# Patient Record
Sex: MALE | Race: WHITE | NOT HISPANIC OR LATINO | Employment: FULL TIME | URBAN - NONMETROPOLITAN AREA
[De-identification: names, ages, dates, MRNs, and addresses within clinical notes are randomized per-mention and may not be internally consistent; named-entity substitution may affect disease eponyms.]

---

## 2020-09-08 ENCOUNTER — TRANSCRIBE ORDERS (OUTPATIENT)
Dept: ADMINISTRATIVE | Facility: HOSPITAL | Age: 25
End: 2020-09-08

## 2020-09-08 DIAGNOSIS — N50.89 TESTICULAR NODULE: Primary | ICD-10-CM

## 2020-09-25 ENCOUNTER — HOSPITAL ENCOUNTER (OUTPATIENT)
Dept: ULTRASOUND IMAGING | Facility: HOSPITAL | Age: 25
Discharge: HOME | End: 2020-09-25
Admitting: NURSE PRACTITIONER

## 2020-09-25 DIAGNOSIS — N50.89 TESTICULAR NODULE: ICD-10-CM

## 2020-09-25 PROCEDURE — 76870 US EXAM SCROTUM: CPT

## 2020-09-25 PROCEDURE — 76870 US EXAM SCROTUM: CPT | Performed by: RADIOLOGY

## 2020-10-09 ENCOUNTER — TRANSCRIBE ORDERS (OUTPATIENT)
Dept: ADMINISTRATIVE | Facility: HOSPITAL | Age: 25
End: 2020-10-09

## 2020-10-09 DIAGNOSIS — IMO0001 ASYMMETRICAL SENSORINEURAL HEARING LOSS OF BOTH EARS: ICD-10-CM

## 2020-10-09 DIAGNOSIS — R42 DIZZINESS AND GIDDINESS: Primary | ICD-10-CM

## 2020-10-20 ENCOUNTER — HOSPITAL ENCOUNTER (OUTPATIENT)
Dept: MRI IMAGING | Facility: HOSPITAL | Age: 25
Discharge: HOME | End: 2020-10-20

## 2020-10-20 DIAGNOSIS — IMO0001 ASYMMETRICAL SENSORINEURAL HEARING LOSS OF BOTH EARS: ICD-10-CM

## 2020-10-20 DIAGNOSIS — R42 DIZZINESS AND GIDDINESS: ICD-10-CM

## 2020-10-20 PROCEDURE — 0 GADOBENATE DIMEGLUMINE 529 MG/ML SOLUTION: Performed by: NURSE PRACTITIONER

## 2020-10-20 PROCEDURE — 70553 MRI BRAIN STEM W/O & W/DYE: CPT | Performed by: RADIOLOGY

## 2020-10-20 PROCEDURE — 70553 MRI BRAIN STEM W/O & W/DYE: CPT

## 2020-10-20 PROCEDURE — 70543 MRI ORBT/FAC/NCK W/O &W/DYE: CPT | Performed by: RADIOLOGY

## 2020-10-20 PROCEDURE — A9577 INJ MULTIHANCE: HCPCS | Performed by: NURSE PRACTITIONER

## 2020-10-20 RX ADMIN — GADOBENATE DIMEGLUMINE 14 ML: 529 INJECTION, SOLUTION INTRAVENOUS at 10:11

## 2021-04-16 ENCOUNTER — HOSPITAL ENCOUNTER (OUTPATIENT)
Dept: GENERAL RADIOLOGY | Facility: HOSPITAL | Age: 26
Discharge: HOME | End: 2021-04-16
Admitting: PSYCHIATRY & NEUROLOGY

## 2021-04-16 ENCOUNTER — TRANSCRIBE ORDERS (OUTPATIENT)
Dept: ADMINISTRATIVE | Facility: HOSPITAL | Age: 26
End: 2021-04-16

## 2021-04-16 DIAGNOSIS — R42 DIZZINESS AND GIDDINESS: Primary | ICD-10-CM

## 2021-04-16 DIAGNOSIS — R42 DIZZINESS AND GIDDINESS: ICD-10-CM

## 2021-04-16 PROCEDURE — 72050 X-RAY EXAM NECK SPINE 4/5VWS: CPT

## 2021-04-16 PROCEDURE — 72050 X-RAY EXAM NECK SPINE 4/5VWS: CPT | Performed by: RADIOLOGY

## 2021-08-20 ENCOUNTER — TRANSCRIBE ORDERS (OUTPATIENT)
Dept: ADMINISTRATIVE | Facility: HOSPITAL | Age: 26
End: 2021-08-20

## 2021-08-20 ENCOUNTER — LAB (OUTPATIENT)
Dept: LAB | Facility: HOSPITAL | Age: 26
End: 2021-08-20

## 2021-08-20 DIAGNOSIS — Z11.59 SPECIAL SCREENING EXAMINATION FOR UNSPECIFIED VIRAL DISEASE: ICD-10-CM

## 2021-08-20 DIAGNOSIS — Z11.59 SPECIAL SCREENING EXAMINATION FOR UNSPECIFIED VIRAL DISEASE: Primary | ICD-10-CM

## 2021-08-20 LAB — SARS-COV-2 RNA PNL SPEC NAA+PROBE: NOT DETECTED

## 2021-08-20 PROCEDURE — U0004 COV-19 TEST NON-CDC HGH THRU: HCPCS | Performed by: INTERNAL MEDICINE

## 2021-08-20 PROCEDURE — C9803 HOPD COVID-19 SPEC COLLECT: HCPCS

## 2023-02-22 RX ORDER — PREDNISONE 1 MG/1
5 TABLET ORAL 3 TIMES DAILY
Qty: 15 TABLET | Refills: 0 | Status: SHIPPED | OUTPATIENT
Start: 2023-02-22 | End: 2023-02-27

## 2023-03-29 DIAGNOSIS — Z00.00 ANNUAL PHYSICAL EXAM: ICD-10-CM

## 2023-03-29 DIAGNOSIS — R53.83 FATIGUE, UNSPECIFIED TYPE: Primary | ICD-10-CM

## 2023-03-29 DIAGNOSIS — E55.9 VITAMIN D DEFICIENCY: ICD-10-CM

## 2023-03-31 DIAGNOSIS — Z00.00 ANNUAL PHYSICAL EXAM: ICD-10-CM

## 2023-03-31 DIAGNOSIS — R53.83 FATIGUE, UNSPECIFIED TYPE: ICD-10-CM

## 2023-03-31 DIAGNOSIS — E55.9 VITAMIN D DEFICIENCY: ICD-10-CM

## 2023-03-31 LAB
ALBUMIN SERPL-MCNC: 4.9 G/DL (ref 3.5–5.2)
ALBUMIN/GLOB SERPL: 1.7 G/DL
ALP SERPL-CCNC: 75 U/L (ref 39–117)
ALT SERPL W P-5'-P-CCNC: 18 U/L (ref 1–41)
ANION GAP SERPL CALCULATED.3IONS-SCNC: 7.9 MMOL/L (ref 5–15)
AST SERPL-CCNC: 22 U/L (ref 1–40)
BASOPHILS # BLD AUTO: 0.02 10*3/MM3 (ref 0–0.2)
BASOPHILS NFR BLD AUTO: 0.4 % (ref 0–1.5)
BILIRUB SERPL-MCNC: 0.6 MG/DL (ref 0–1.2)
BUN SERPL-MCNC: 18 MG/DL (ref 6–20)
BUN/CREAT SERPL: 17.3 (ref 7–25)
CALCIUM SPEC-SCNC: 9.9 MG/DL (ref 8.6–10.5)
CHLORIDE SERPL-SCNC: 110 MMOL/L (ref 98–107)
CO2 SERPL-SCNC: 27.1 MMOL/L (ref 22–29)
CREAT SERPL-MCNC: 1.04 MG/DL (ref 0.76–1.27)
DEPRECATED RDW RBC AUTO: 36.9 FL (ref 37–54)
EGFRCR SERPLBLD CKD-EPI 2021: 101.6 ML/MIN/1.73
EOSINOPHIL # BLD AUTO: 0.06 10*3/MM3 (ref 0–0.4)
EOSINOPHIL NFR BLD AUTO: 1.2 % (ref 0.3–6.2)
ERYTHROCYTE [DISTWIDTH] IN BLOOD BY AUTOMATED COUNT: 11.7 % (ref 12.3–15.4)
FERRITIN SERPL-MCNC: 150.1 NG/ML (ref 30–400)
GLOBULIN UR ELPH-MCNC: 2.9 GM/DL
GLUCOSE SERPL-MCNC: 89 MG/DL (ref 65–99)
HCT VFR BLD AUTO: 46.8 % (ref 37.5–51)
HGB BLD-MCNC: 16.5 G/DL (ref 13–17.7)
IMM GRANULOCYTES # BLD AUTO: 0.01 10*3/MM3 (ref 0–0.05)
IMM GRANULOCYTES NFR BLD AUTO: 0.2 % (ref 0–0.5)
IRON 24H UR-MRATE: 134 MCG/DL (ref 59–158)
IRON SATN MFR SERPL: 33 % (ref 20–50)
LYMPHOCYTES # BLD AUTO: 1.9 10*3/MM3 (ref 0.7–3.1)
LYMPHOCYTES NFR BLD AUTO: 37.3 % (ref 19.6–45.3)
MCH RBC QN AUTO: 30.2 PG (ref 26.6–33)
MCHC RBC AUTO-ENTMCNC: 35.3 G/DL (ref 31.5–35.7)
MCV RBC AUTO: 85.6 FL (ref 79–97)
MONOCYTES # BLD AUTO: 0.32 10*3/MM3 (ref 0.1–0.9)
MONOCYTES NFR BLD AUTO: 6.3 % (ref 5–12)
NEUTROPHILS NFR BLD AUTO: 2.78 10*3/MM3 (ref 1.7–7)
NEUTROPHILS NFR BLD AUTO: 54.6 % (ref 42.7–76)
NRBC BLD AUTO-RTO: 0 /100 WBC (ref 0–0.2)
PLATELET # BLD AUTO: 203 10*3/MM3 (ref 140–450)
PMV BLD AUTO: 9.7 FL (ref 6–12)
POTASSIUM SERPL-SCNC: 4.7 MMOL/L (ref 3.5–5.2)
PROT SERPL-MCNC: 7.8 G/DL (ref 6–8.5)
RBC # BLD AUTO: 5.47 10*6/MM3 (ref 4.14–5.8)
SODIUM SERPL-SCNC: 145 MMOL/L (ref 136–145)
TIBC SERPL-MCNC: 404 MCG/DL (ref 298–536)
TRANSFERRIN SERPL-MCNC: 271 MG/DL (ref 200–360)
TSH SERPL DL<=0.05 MIU/L-ACNC: 2.32 UIU/ML (ref 0.27–4.2)
WBC NRBC COR # BLD: 5.09 10*3/MM3 (ref 3.4–10.8)

## 2023-03-31 PROCEDURE — 80053 COMPREHEN METABOLIC PANEL: CPT | Performed by: NURSE PRACTITIONER

## 2023-03-31 PROCEDURE — 85025 COMPLETE CBC W/AUTO DIFF WBC: CPT | Performed by: NURSE PRACTITIONER

## 2023-03-31 PROCEDURE — 82728 ASSAY OF FERRITIN: CPT | Performed by: NURSE PRACTITIONER

## 2023-03-31 PROCEDURE — 82306 VITAMIN D 25 HYDROXY: CPT | Performed by: NURSE PRACTITIONER

## 2023-03-31 PROCEDURE — 83540 ASSAY OF IRON: CPT | Performed by: NURSE PRACTITIONER

## 2023-03-31 PROCEDURE — 82746 ASSAY OF FOLIC ACID SERUM: CPT | Performed by: NURSE PRACTITIONER

## 2023-03-31 PROCEDURE — 84466 ASSAY OF TRANSFERRIN: CPT | Performed by: NURSE PRACTITIONER

## 2023-03-31 PROCEDURE — 82607 VITAMIN B-12: CPT | Performed by: NURSE PRACTITIONER

## 2023-03-31 PROCEDURE — 84443 ASSAY THYROID STIM HORMONE: CPT | Performed by: NURSE PRACTITIONER

## 2023-04-01 LAB
25(OH)D3 SERPL-MCNC: 31.9 NG/ML (ref 30–100)
FOLATE SERPL-MCNC: 14.3 NG/ML (ref 4.78–24.2)
VIT B12 BLD-MCNC: 677 PG/ML (ref 211–946)

## 2023-04-03 LAB — REF LAB TEST METHOD: NORMAL

## 2024-03-19 RX ORDER — METHYLPREDNISOLONE 8 MG/1
8 TABLET ORAL
Status: DISCONTINUED | OUTPATIENT
Start: 2024-03-19 | End: 2024-03-19

## 2024-03-19 RX ORDER — METHYLPREDNISOLONE 4 MG/1
4 TABLET ORAL
Status: DISCONTINUED | OUTPATIENT
Start: 2024-03-23 | End: 2024-03-19

## 2024-03-19 RX ORDER — METHYLPREDNISOLONE 4 MG/1
4 TABLET ORAL
Status: DISCONTINUED | OUTPATIENT
Start: 2024-03-21 | End: 2024-03-19

## 2024-03-19 RX ORDER — METHYLPREDNISOLONE 4 MG/1
4 TABLET ORAL
Status: DISCONTINUED | OUTPATIENT
Start: 2024-03-24 | End: 2024-03-19

## 2024-03-19 RX ORDER — METHYLPREDNISOLONE 4 MG/1
4 TABLET ORAL
Status: DISCONTINUED | OUTPATIENT
Start: 2024-03-19 | End: 2024-03-19

## 2024-03-19 RX ORDER — METHYLPREDNISOLONE 4 MG/1
TABLET ORAL
Qty: 21 TABLET | Refills: 0 | Status: SHIPPED | OUTPATIENT
Start: 2024-03-19

## 2024-03-19 RX ORDER — METHYLPREDNISOLONE 4 MG/1
4 TABLET ORAL
Status: DISCONTINUED | OUTPATIENT
Start: 2024-03-22 | End: 2024-03-19

## 2024-03-19 RX ORDER — METHYLPREDNISOLONE 4 MG/1
4 TABLET ORAL
Status: DISCONTINUED | OUTPATIENT
Start: 2024-03-20 | End: 2024-03-19

## 2024-03-19 RX ORDER — METHYLPREDNISOLONE 8 MG/1
8 TABLET ORAL
Status: DISCONTINUED | OUTPATIENT
Start: 2024-03-20 | End: 2024-03-19

## 2024-06-14 RX ORDER — METHYLPREDNISOLONE 4 MG/1
TABLET ORAL
Qty: 21 TABLET | Refills: 0 | Status: SHIPPED | OUTPATIENT
Start: 2024-06-14

## 2024-09-27 ENCOUNTER — OFFICE VISIT (OUTPATIENT)
Dept: FAMILY MEDICINE CLINIC | Facility: CLINIC | Age: 29
End: 2024-09-27
Payer: COMMERCIAL

## 2024-09-27 ENCOUNTER — LAB (OUTPATIENT)
Dept: FAMILY MEDICINE CLINIC | Facility: CLINIC | Age: 29
End: 2024-09-27
Payer: COMMERCIAL

## 2024-09-27 ENCOUNTER — HOSPITAL ENCOUNTER (OUTPATIENT)
Dept: RESPIRATORY THERAPY | Facility: HOSPITAL | Age: 29
Discharge: HOME | End: 2024-09-27
Admitting: NURSE PRACTITIONER
Payer: COMMERCIAL

## 2024-09-27 VITALS
TEMPERATURE: 97 F | HEART RATE: 50 BPM | SYSTOLIC BLOOD PRESSURE: 124 MMHG | OXYGEN SATURATION: 99 % | WEIGHT: 157.2 LBS | DIASTOLIC BLOOD PRESSURE: 72 MMHG | HEIGHT: 70 IN | BODY MASS INDEX: 22.5 KG/M2

## 2024-09-27 DIAGNOSIS — R01.1 HEART MURMUR: ICD-10-CM

## 2024-09-27 DIAGNOSIS — R00.1 BRADYCARDIA: ICD-10-CM

## 2024-09-27 DIAGNOSIS — R00.2 PALPITATIONS: Primary | ICD-10-CM

## 2024-09-27 DIAGNOSIS — R53.83 OTHER FATIGUE: ICD-10-CM

## 2024-09-27 DIAGNOSIS — R73.9 HYPERGLYCEMIA: ICD-10-CM

## 2024-09-27 DIAGNOSIS — R00.2 PALPITATIONS: ICD-10-CM

## 2024-09-27 DIAGNOSIS — Z13.6 SCREENING FOR CARDIOVASCULAR CONDITION: ICD-10-CM

## 2024-09-27 PROCEDURE — 80050 GENERAL HEALTH PANEL: CPT | Performed by: NURSE PRACTITIONER

## 2024-09-27 PROCEDURE — 93000 ELECTROCARDIOGRAM COMPLETE: CPT | Performed by: NURSE PRACTITIONER

## 2024-09-27 PROCEDURE — 81003 URINALYSIS AUTO W/O SCOPE: CPT | Performed by: NURSE PRACTITIONER

## 2024-09-27 PROCEDURE — 80061 LIPID PANEL: CPT | Performed by: NURSE PRACTITIONER

## 2024-09-27 PROCEDURE — 83036 HEMOGLOBIN GLYCOSYLATED A1C: CPT | Performed by: NURSE PRACTITIONER

## 2024-09-27 PROCEDURE — 93242 EXT ECG>48HR<7D RECORDING: CPT

## 2024-09-27 PROCEDURE — 99204 OFFICE O/P NEW MOD 45 MIN: CPT | Performed by: NURSE PRACTITIONER

## 2024-09-28 LAB
ALBUMIN SERPL-MCNC: 5.2 G/DL (ref 3.5–5.2)
ALBUMIN/GLOB SERPL: 2.1 G/DL
ALP SERPL-CCNC: 75 U/L (ref 39–117)
ALT SERPL W P-5'-P-CCNC: 22 U/L (ref 1–41)
ANION GAP SERPL CALCULATED.3IONS-SCNC: 12 MMOL/L (ref 5–15)
AST SERPL-CCNC: 20 U/L (ref 1–40)
BILIRUB SERPL-MCNC: 0.7 MG/DL (ref 0–1.2)
BILIRUB UR QL STRIP: NEGATIVE
BUN SERPL-MCNC: 12 MG/DL (ref 6–20)
BUN/CREAT SERPL: 12 (ref 7–25)
CALCIUM SPEC-SCNC: 10.5 MG/DL (ref 8.6–10.5)
CHLORIDE SERPL-SCNC: 102 MMOL/L (ref 98–107)
CHOLEST SERPL-MCNC: 198 MG/DL (ref 0–200)
CLARITY UR: CLEAR
CO2 SERPL-SCNC: 28 MMOL/L (ref 22–29)
COLOR UR: YELLOW
CREAT SERPL-MCNC: 1 MG/DL (ref 0.76–1.27)
DEPRECATED RDW RBC AUTO: 39.9 FL (ref 37–54)
EGFRCR SERPLBLD CKD-EPI 2021: 105.8 ML/MIN/1.73
ERYTHROCYTE [DISTWIDTH] IN BLOOD BY AUTOMATED COUNT: 12.3 % (ref 12.3–15.4)
GLOBULIN UR ELPH-MCNC: 2.5 GM/DL
GLUCOSE SERPL-MCNC: 82 MG/DL (ref 65–99)
GLUCOSE UR STRIP-MCNC: NEGATIVE MG/DL
HBA1C MFR BLD: 4.9 % (ref 4.8–5.6)
HCT VFR BLD AUTO: 49.7 % (ref 37.5–51)
HDLC SERPL-MCNC: 49 MG/DL (ref 40–60)
HGB BLD-MCNC: 17.3 G/DL (ref 13–17.7)
HGB UR QL STRIP.AUTO: NEGATIVE
HOLD SPECIMEN: NORMAL
KETONES UR QL STRIP: NEGATIVE
LDLC SERPL CALC-MCNC: 128 MG/DL (ref 0–100)
LDLC/HDLC SERPL: 2.57 {RATIO}
LEUKOCYTE ESTERASE UR QL STRIP.AUTO: NEGATIVE
MCH RBC QN AUTO: 30.8 PG (ref 26.6–33)
MCHC RBC AUTO-ENTMCNC: 34.8 G/DL (ref 31.5–35.7)
MCV RBC AUTO: 88.4 FL (ref 79–97)
NITRITE UR QL STRIP: NEGATIVE
PH UR STRIP.AUTO: 6.5 [PH] (ref 5–8)
PLATELET # BLD AUTO: 213 10*3/MM3 (ref 140–450)
PMV BLD AUTO: 10.5 FL (ref 6–12)
POTASSIUM SERPL-SCNC: 4.2 MMOL/L (ref 3.5–5.2)
PROT SERPL-MCNC: 7.7 G/DL (ref 6–8.5)
PROT UR QL STRIP: NEGATIVE
RBC # BLD AUTO: 5.62 10*6/MM3 (ref 4.14–5.8)
SODIUM SERPL-SCNC: 142 MMOL/L (ref 136–145)
SP GR UR STRIP: 1.01 (ref 1–1.03)
TRIGL SERPL-MCNC: 116 MG/DL (ref 0–150)
TSH SERPL DL<=0.05 MIU/L-ACNC: 3.17 UIU/ML (ref 0.27–4.2)
UROBILINOGEN UR QL STRIP: NORMAL
VLDLC SERPL-MCNC: 21 MG/DL (ref 5–40)
WBC NRBC COR # BLD AUTO: 5.33 10*3/MM3 (ref 3.4–10.8)

## 2024-09-30 ENCOUNTER — TELEPHONE (OUTPATIENT)
Dept: FAMILY MEDICINE CLINIC | Facility: CLINIC | Age: 29
End: 2024-09-30
Payer: COMMERCIAL

## 2024-10-02 ENCOUNTER — PATIENT ROUNDING (BHMG ONLY) (OUTPATIENT)
Dept: FAMILY MEDICINE CLINIC | Facility: CLINIC | Age: 29
End: 2024-10-02
Payer: COMMERCIAL

## 2024-10-04 ENCOUNTER — OFFICE VISIT (OUTPATIENT)
Dept: CARDIOLOGY | Facility: CLINIC | Age: 29
End: 2024-10-04
Payer: COMMERCIAL

## 2024-10-04 VITALS
BODY MASS INDEX: 23.31 KG/M2 | HEART RATE: 54 BPM | WEIGHT: 157.4 LBS | DIASTOLIC BLOOD PRESSURE: 70 MMHG | OXYGEN SATURATION: 99 % | SYSTOLIC BLOOD PRESSURE: 113 MMHG | RESPIRATION RATE: 16 BRPM | HEIGHT: 69 IN

## 2024-10-04 DIAGNOSIS — R06.09 DOE (DYSPNEA ON EXERTION): ICD-10-CM

## 2024-10-04 DIAGNOSIS — R06.02 SHORTNESS OF BREATH: ICD-10-CM

## 2024-10-04 DIAGNOSIS — R00.2 PALPITATIONS: Primary | ICD-10-CM

## 2024-10-11 DIAGNOSIS — R53.83 OTHER FATIGUE: Primary | ICD-10-CM

## 2024-10-11 DIAGNOSIS — R00.1 BRADYCARDIA: ICD-10-CM

## 2024-10-18 ENCOUNTER — HOSPITAL ENCOUNTER (OUTPATIENT)
Dept: CARDIOLOGY | Facility: HOSPITAL | Age: 29
Discharge: HOME | End: 2024-10-18
Payer: COMMERCIAL

## 2024-10-18 DIAGNOSIS — R00.2 PALPITATIONS: ICD-10-CM

## 2024-10-18 DIAGNOSIS — R06.02 SHORTNESS OF BREATH: ICD-10-CM

## 2024-10-18 PROCEDURE — 93017 CV STRESS TEST TRACING ONLY: CPT

## 2024-10-18 PROCEDURE — 93306 TTE W/DOPPLER COMPLETE: CPT

## 2024-10-21 LAB
BH CV ECHO MEAS - ACS: 2.2 CM
BH CV ECHO MEAS - AO MAX PG: 6.5 MMHG
BH CV ECHO MEAS - AO MEAN PG: 3 MMHG
BH CV ECHO MEAS - AO ROOT DIAM: 2.6 CM
BH CV ECHO MEAS - AO V2 MAX: 127 CM/SEC
BH CV ECHO MEAS - AO V2 VTI: 30 CM
BH CV ECHO MEAS - EDV(CUBED): 97.3 ML
BH CV ECHO MEAS - EDV(MOD-SP2): 107 ML
BH CV ECHO MEAS - EDV(MOD-SP4): 104 ML
BH CV ECHO MEAS - EF(MOD-BP): 62.8 %
BH CV ECHO MEAS - EF(MOD-SP2): 61.6 %
BH CV ECHO MEAS - EF(MOD-SP4): 62.9 %
BH CV ECHO MEAS - ESV(CUBED): 32.8 ML
BH CV ECHO MEAS - ESV(MOD-SP2): 41.1 ML
BH CV ECHO MEAS - ESV(MOD-SP4): 38.6 ML
BH CV ECHO MEAS - FS: 30.4 %
BH CV ECHO MEAS - IVS/LVPW: 1 CM
BH CV ECHO MEAS - IVSD: 1 CM
BH CV ECHO MEAS - LA DIMENSION: 2.7 CM
BH CV ECHO MEAS - LAT PEAK E' VEL: 21 CM/SEC
BH CV ECHO MEAS - LV DIASTOLIC VOL/BSA (35-75): 55.8 CM2
BH CV ECHO MEAS - LV MASS(C)D: 158.8 GRAMS
BH CV ECHO MEAS - LV MAX PG: 4.7 MMHG
BH CV ECHO MEAS - LV MEAN PG: 3 MMHG
BH CV ECHO MEAS - LV SYSTOLIC VOL/BSA (12-30): 20.7 CM2
BH CV ECHO MEAS - LV V1 MAX: 108 CM/SEC
BH CV ECHO MEAS - LV V1 VTI: 25.5 CM
BH CV ECHO MEAS - LVIDD: 4.6 CM
BH CV ECHO MEAS - LVIDS: 3.2 CM
BH CV ECHO MEAS - LVPWD: 1 CM
BH CV ECHO MEAS - MED PEAK E' VEL: 14.8 CM/SEC
BH CV ECHO MEAS - MV A MAX VEL: 31.7 CM/SEC
BH CV ECHO MEAS - MV DEC SLOPE: 354 CM/SEC2
BH CV ECHO MEAS - MV DEC TIME: 0.32 SEC
BH CV ECHO MEAS - MV E MAX VEL: 112 CM/SEC
BH CV ECHO MEAS - MV E/A: 3.5
BH CV ECHO MEAS - PA ACC TIME: 0.22 SEC
BH CV ECHO MEAS - RAP SYSTOLE: 10 MMHG
BH CV ECHO MEAS - RVSP: 29.9 MMHG
BH CV ECHO MEAS - SV(MOD-SP2): 65.9 ML
BH CV ECHO MEAS - SV(MOD-SP4): 65.4 ML
BH CV ECHO MEAS - SVI(MOD-SP2): 35.4 ML/M2
BH CV ECHO MEAS - SVI(MOD-SP4): 35.1 ML/M2
BH CV ECHO MEAS - TAPSE (>1.6): 2.43 CM
BH CV ECHO MEAS - TR MAX PG: 19.9 MMHG
BH CV ECHO MEAS - TR MAX VEL: 223 CM/SEC
BH CV ECHO MEASUREMENTS AVERAGE E/E' RATIO: 6.26
BH CV STRESS BP STAGE 1: NORMAL
BH CV STRESS BP STAGE 2: NORMAL
BH CV STRESS BP STAGE 3: NORMAL
BH CV STRESS BP STAGE 4: NORMAL
BH CV STRESS BP STAGE 5: NORMAL
BH CV STRESS DURATION MIN STAGE 1: 3
BH CV STRESS DURATION MIN STAGE 2: 3
BH CV STRESS DURATION MIN STAGE 3: 3
BH CV STRESS DURATION MIN STAGE 4: 3
BH CV STRESS DURATION MIN STAGE 5: 3
BH CV STRESS DURATION MIN STAGE 6: 0
BH CV STRESS DURATION SEC STAGE 1: 0
BH CV STRESS DURATION SEC STAGE 2: 0
BH CV STRESS DURATION SEC STAGE 3: 0
BH CV STRESS DURATION SEC STAGE 4: 0
BH CV STRESS DURATION SEC STAGE 5: 0
BH CV STRESS DURATION SEC STAGE 6: 1
BH CV STRESS GRADE STAGE 1: 10
BH CV STRESS GRADE STAGE 2: 12
BH CV STRESS GRADE STAGE 3: 14
BH CV STRESS GRADE STAGE 4: 16
BH CV STRESS GRADE STAGE 5: 18
BH CV STRESS GRADE STAGE 6: 20
BH CV STRESS HR STAGE 1: 82
BH CV STRESS HR STAGE 2: 99
BH CV STRESS HR STAGE 3: 133
BH CV STRESS HR STAGE 4: 133
BH CV STRESS HR STAGE 5: 164
BH CV STRESS HR STAGE 6: 164
BH CV STRESS METS STAGE 1: 5
BH CV STRESS METS STAGE 2: 7.5
BH CV STRESS METS STAGE 3: 10
BH CV STRESS METS STAGE 4: 13.5
BH CV STRESS METS STAGE 5: 15
BH CV STRESS METS STAGE 6: 17
BH CV STRESS PROTOCOL 1: NORMAL
BH CV STRESS RECOVERY BP: NORMAL MMHG
BH CV STRESS RECOVERY HR: 90 BPM
BH CV STRESS SPEED STAGE 1: 1.7
BH CV STRESS SPEED STAGE 2: 2.5
BH CV STRESS SPEED STAGE 3: 3.4
BH CV STRESS SPEED STAGE 4: 4.2
BH CV STRESS SPEED STAGE 5: 5
BH CV STRESS SPEED STAGE 6: 5.5 MPH
BH CV STRESS STAGE 1: 1
BH CV STRESS STAGE 2: 2
BH CV STRESS STAGE 3: 3
BH CV STRESS STAGE 4: 4
BH CV STRESS STAGE 5: 5
BH CV STRESS STAGE 6: 6
LEFT ATRIUM VOLUME INDEX: 16.5 ML/M2
MAXIMAL PREDICTED HEART RATE: 193 BPM
PERCENT MAX PREDICTED HR: 84.97 %
STRESS BASELINE BP: NORMAL MMHG
STRESS BASELINE HR: 72 BPM
STRESS PERCENT HR: 100 %
STRESS POST ESTIMATED WORKLOAD: 17.2 METS
STRESS POST EXERCISE DUR MIN: 15 MIN
STRESS POST EXERCISE DUR SEC: 1 SEC
STRESS POST PEAK BP: NORMAL MMHG
STRESS POST PEAK HR: 164 BPM
STRESS TARGET HR: 164 BPM

## 2024-11-01 ENCOUNTER — OFFICE VISIT (OUTPATIENT)
Dept: CARDIOLOGY | Facility: CLINIC | Age: 29
End: 2024-11-01
Payer: COMMERCIAL

## 2024-11-01 VITALS
DIASTOLIC BLOOD PRESSURE: 63 MMHG | WEIGHT: 157.4 LBS | OXYGEN SATURATION: 100 % | HEIGHT: 69 IN | SYSTOLIC BLOOD PRESSURE: 121 MMHG | HEART RATE: 59 BPM | BODY MASS INDEX: 23.31 KG/M2

## 2024-11-01 DIAGNOSIS — Z82.49 FAMILY HISTORY OF CORONARY ARTERY DISEASE: ICD-10-CM

## 2024-11-01 DIAGNOSIS — R00.2 PALPITATIONS: Primary | ICD-10-CM

## 2024-11-01 DIAGNOSIS — R06.02 SHORTNESS OF BREATH: ICD-10-CM

## 2024-11-01 NOTE — PROGRESS NOTES
Kelly De La Torre, LYNNE  No ref. provider found    Kevin Lorenzana  1995  10/04/2024    Subjective     Kevin Lorenzana is a 28 y.o. male who presents today to [facility]ARDIOLOG[facility] for Follow-up (Test results /) and Med Management (Verbal. Patient takes no medications, /).    Follow-up    28-year-old male with history of heart murmur previously had an echocardiogram back in 2015 which was unremarkable comes in today for evaluation.  Patient reports that he has been having episodes of  palpitations about every day for last 1 month.  Patient also reports that he had an episode of increased fatigue while he was on a hike about 2 weeks ago. Patient reports that he wore an event monitor for about 3 days.  Which he returned last Monday the results are not still back for review yet.  He does report that he is physically very active and about a month ago did a 7 mile run he denies noticing any decline in his tolerance of physical activity. .  No significant chest pain shortness of breath with physical activity.  Denies having any episodes of loss of consciousness.     Denies having any allergies.  Family history positive for heart disease in mother above age of 55.  Denies smoking or drug use.     Today's visit 11/1/2024:  Patient comes in today for follow-up visit.  His echocardiogram was done on 10/21/2024 which shows normal ejection fraction with normal valvular function with no significant abnormalities.  He got the exercise stress EKG done on 10/21/2024 in which she exercised for 15 minutes.  No evidence of ischemia or chest pain.  Reviewed the results of his heart monitor which shows heart rate ranging from 35 to 143 bpm.  Up to 2.2-second pause noted while patient was asleep.     No Known Allergies:    Current Outpatient Medications:     cyclobenzaprine (FLEXERIL) 5 MG tablet, TAKE 1 TABLET BY MOUTH AT BEDTIME. (Patient not taking: Reported on 11/1/2024), Disp: 30 tablet, Rfl: 0     "methylPREDNISolone (MEDROL) 4 MG dose pack, TAKE BY MOUTH AS DIRECTED ON PACKAGE. (Patient not taking: Reported on 11/1/2024), Disp: 21 tablet, Rfl: 0    rizatriptan (Maxalt) 10 MG tablet, Take 1 tablet by mouth once at onset of headache, may repeat at 2 hour intervals; do not exceed 30 mg in 24 hours. (Patient not taking: Reported on 11/1/2024), Disp: 10 tablet, Rfl: 0    triamcinolone (KENALOG) 0.1 % cream, Apply a thin layer to the affected area twice daily. (Patient not taking: Reported on 11/1/2024), Disp: 60 g, Rfl: 2    ubrogepant (ubrogepant) 50 MG tablet, Take 1 tablet by mouth as needed for migraine may repeat dose once after 2 hours if needed (Patient not taking: Reported on 11/1/2024), Disp: 16 tablet, Rfl: 0    Past Medical History:   Diagnosis Date    Heart murmur      No past surgical history on file.  Family History   Problem Relation Age of Onset    Heart disease Mother     No Known Problems Father      Social History     Tobacco Use    Smoking status: Never    Smokeless tobacco: Never   Vaping Use    Vaping status: Never Used   Substance Use Topics    Alcohol use: Not Currently    Drug use: Never       Objective   Blood pressure 121/63, pulse 59, height 175.3 cm (69.02\"), weight 71.4 kg (157 lb 6.4 oz), SpO2 100%.  Body mass index is 23.23 kg/m².    Constitutional:       Appearance: Healthy appearance. Not in distress.   Neck:      Vascular: No JVR. JVD normal.   Pulmonary:      Effort: Pulmonary effort is normal.      Breath sounds: Normal breath sounds. No wheezing. No rhonchi. No rales.   Chest:      Chest wall: Not tender to palpatation.   Cardiovascular:      Normal rate. Regular rhythm. Normal S1. Normal S2.       Murmurs: There is no murmur.      No gallop.  No click. No rub.   Pulses:     Intact distal pulses.   Edema:     Peripheral edema absent.   Abdominal:      Tenderness: There is no abdominal tenderness.   Musculoskeletal: Normal range of motion.         General: No tenderness. " Skin:     General: Skin is warm and dry.   Neurological:      General: No focal deficit present.      Mental Status: Alert and oriented to person, place and time.           DATA:   Results for orders placed during the hospital encounter of 10/18/24    Adult Transthoracic Echo Complete W/ Cont if Necessary Per Protocol    Interpretation Summary    Left ventricular systolic function is normal. Calculated left ventricular EF = 62.8%    Left ventricular diastolic function was normal.    Normal right ventricular cavity size and systolic function noted.    Estimated right ventricular systolic pressure from tricuspid regurgitation is normal (<35 mmHg).    No significant valvular abnormalities are seen.    There is no evidence of pericardial effusion    No prior study available for comparison.   Results for orders placed during the hospital encounter of 10/18/24    Treadmill Stress Test    Interpretation Summary    Patient walked 15:01 on treadmill reaching target heart rate and workload of 17.2 METS. Patient denied any chest discomfort during exercise.    The Duke Treadmill Score of 15 is consistent with a Low risk for ischemic heart disease    Blood pressure demonstrated a hypertensive response to stress.    Normal ECG with no significant stress induced changes noted.    There were no significant arrhythmias noted.   Results for orders placed during the hospital encounter of 10/18/24    Treadmill Stress Test    Interpretation Summary    Patient walked 15:01 on treadmill reaching target heart rate and workload of 17.2 METS. Patient denied any chest discomfort during exercise.    The Duke Treadmill Score of 15 is consistent with a Low risk for ischemic heart disease    Blood pressure demonstrated a hypertensive response to stress.    Normal ECG with no significant stress induced changes noted.    There were no significant arrhythmias noted.    Results for orders placed during the hospital encounter of 09/25/20    US Scrotum  "& Testicles    Narrative  US SCROTUM AND TESTICLES-    REASON FOR EXAM:  N50.89; N50.89-Other specified disorders of the male  genital organs    FINDINGS: Multiple real-time high-frequency images were obtained in the  scrotum. Right testicle was homogeneous in its echotexture. No masses  were noted in the testicle. There was color Doppler flow. Just beneath  the testicle there is some enlargement of the epididymis. There is no  significant amount of fluid in the scrotum. The left testicle was also  homogeneous in its echotexture and showed no evidence of mass. There was  color Doppler flow in the testicle. There is hyperemic flow around the  right testicle and the epididymis as compared to the left.    Impression  Ultrasound findings consistent with epididymitis on the  right. No testicular masses were demonstrated. There is symmetric color  Doppler flow in both testicles.    This report was finalized on 9/25/2020 1:20 PM by Dr. Matheus Menjivar II, MD.      No results found for: \"BNP\"  No results found for: \"PSA\"   No results found for: \"MG\"  No results found for: \"INR\"  No results found for: \"CKTOTAL\"  Lab Results   Component Value Date    CHOL 198 09/27/2024     Lab Results   Component Value Date    TRIG 116 09/27/2024     Lab Results   Component Value Date    HDL 49 09/27/2024     Lab Results   Component Value Date     (H) 09/27/2024     No components found for: \"A1C\"      Lab Results   Component Value Date    TSH 3.170 09/27/2024             Invalid input(s): \"LABALBU\", \"PROT\"            Results review: During today's encounter, all relevant clinical data has been reviewed.      Procedures    Assessment & Plan    Diagnosis Plan   1. Palpitations  Lipid Panel      2. Shortness of breath  Lipid Panel      3. Family history of coronary artery disease              Recommendations  Orders Placed This Encounter   Procedures    Lipid Panel        The echocardiogram and exercise stress results are overall " unremarkable.  Patient lies in the low risk category for having cardiovascular events at this time.   The asymptomatic bradycardia associated with 2.2-second pauses while asleep is likely a physiologic finding.  No interventions indicated at this point.  Patient denies having any snoring or increased fatigue during the day.  Can hold off on sleep study at this time.   Appropriate diet and exercise regimen discussed with patient. Patient verbalized understanding.   For his high LDL a diet low in refined carbohydrates and high in vegetable-sourced fiber with avoidance of excessive animal products was recommended, stressing the importance of substituting saturated fats with mono-unsaturated fats, such as olive oil, essential fatty acids and oily types of fish.  Will check the LDL in 4 months.  The goal of LDL for him is less than 100.         New Medications:   No orders of the defined types were placed in this encounter.      Discontinued Medications:   There are no discontinued medications.     Return in about 1 year (around 11/1/2025).      Thank you for allowing me to participate in the care of Kevin Lorenzana. Feel free to contact me directly with any further questions or concerns.      This document has been electronically signed by Germania Garcia MD   November 1, 2024 11:34 EDT    Dictated Utilizing Dragon Dictation: Part of this note may be an electronic transcription/translation of spoken language to printed text using the Dragon Dictation System.

## 2024-11-20 ENCOUNTER — TELEPHONE (OUTPATIENT)
Dept: FAMILY MEDICINE CLINIC | Facility: CLINIC | Age: 29
End: 2024-11-20

## 2024-11-20 NOTE — TELEPHONE ENCOUNTER
Caller: KARO WITH [facility]    Relationship: Provider    Best call back number: [phone]     What test was performed: SLEEP STUDY AT HOME    When was the test performed: PATIENT DID NOT COMPLETE THE HOME SLEEP STUDY.    Additional notes: PATIENT STATED TO [facility] THAT HE WAS REFERRED TO A CARDIOLOGIST AND WAS TOLD HE DID NOT NEED THE SLEEP STUDY.    [facility] STATES THAT IF THE SLEEP STUDY IS NEEDED- PLEASE SEND A NEW REFERRAL.    FAX: [phone]

## 2024-11-27 RX ORDER — AZITHROMYCIN 250 MG/1
TABLET, FILM COATED ORAL
Qty: 6 TABLET | Refills: 0 | Status: SHIPPED | OUTPATIENT
Start: 2024-11-27

## 2025-03-31 ENCOUNTER — LAB (OUTPATIENT)
Dept: FAMILY MEDICINE CLINIC | Facility: CLINIC | Age: 30
End: 2025-03-31
Payer: COMMERCIAL

## 2025-03-31 ENCOUNTER — OFFICE VISIT (OUTPATIENT)
Dept: FAMILY MEDICINE CLINIC | Facility: CLINIC | Age: 30
End: 2025-03-31
Payer: COMMERCIAL

## 2025-03-31 VITALS
BODY MASS INDEX: 23.55 KG/M2 | DIASTOLIC BLOOD PRESSURE: 78 MMHG | HEIGHT: 69 IN | HEART RATE: 106 BPM | TEMPERATURE: 97.8 F | WEIGHT: 159 LBS | OXYGEN SATURATION: 100 % | SYSTOLIC BLOOD PRESSURE: 130 MMHG

## 2025-03-31 DIAGNOSIS — R50.9 FEVER, UNSPECIFIED FEVER CAUSE: ICD-10-CM

## 2025-03-31 DIAGNOSIS — R50.9 FEVER, UNSPECIFIED FEVER CAUSE: Primary | ICD-10-CM

## 2025-03-31 LAB
EXPIRATION DATE: NORMAL
EXPIRATION DATE: NORMAL
FLUAV AG UPPER RESP QL IA.RAPID: NOT DETECTED
FLUBV AG UPPER RESP QL IA.RAPID: NOT DETECTED
HETEROPH AB SER QL LA: NEGATIVE
INTERNAL CONTROL: NORMAL
INTERNAL CONTROL: NORMAL
Lab: NORMAL
Lab: NORMAL
S PYO AG THROAT QL: NEGATIVE
SARS-COV-2 AG UPPER RESP QL IA.RAPID: NOT DETECTED

## 2025-03-31 PROCEDURE — 80053 COMPREHEN METABOLIC PANEL: CPT | Performed by: FAMILY MEDICINE

## 2025-03-31 PROCEDURE — 86308 HETEROPHILE ANTIBODY SCREEN: CPT | Performed by: FAMILY MEDICINE

## 2025-03-31 PROCEDURE — 87880 STREP A ASSAY W/OPTIC: CPT | Performed by: FAMILY MEDICINE

## 2025-03-31 PROCEDURE — 85025 COMPLETE CBC W/AUTO DIFF WBC: CPT | Performed by: FAMILY MEDICINE

## 2025-03-31 PROCEDURE — 87428 SARSCOV & INF VIR A&B AG IA: CPT | Performed by: FAMILY MEDICINE

## 2025-03-31 PROCEDURE — 99213 OFFICE O/P EST LOW 20 MIN: CPT | Performed by: FAMILY MEDICINE

## 2025-03-31 PROCEDURE — 36415 COLL VENOUS BLD VENIPUNCTURE: CPT

## 2025-03-31 RX ORDER — MECLIZINE HCL 12.5 MG 12.5 MG/1
12.5 TABLET ORAL 3 TIMES DAILY PRN
COMMUNITY

## 2025-03-31 NOTE — PROGRESS NOTES
"Chief Complaint  Fever    Subjective          Kevin Lorenzana presents to [facility]  Fever       History of Present Illness  The patient is a 29-year-old male who presents for evaluation of fatigue.    He reported the onset of a cough yesterday, which was followed by the development of fever, chills, and body aches by the end of the day. He also experienced a similar episode from mid-February to early March, although he is uncertain if these two events are related. His fever peaked at 102 degrees during the initial episode and reached 101.1 degrees yesterday. He has not been in contact with any sick individuals to his knowledge, but notes that his daughter has been ill with bronchiolitis. He experiences mild throat discomfort due to coughing and morning nausea, which he considers normal. He also suspects the occasional presence of ulcers. He reports no recent illness prior to the current episode. He has a history of mononucleosis in 2021, which he believes may have recurred.    Review of Systems   Constitutional:  Positive for fever.         Objective   Vital Signs:   /78 (BP Location: Right arm, Patient Position: Sitting, Cuff Size: Adult)   Pulse 106   Temp 97.8 °F (36.6 °C) (Temporal)   Ht 175.3 cm (69.02\")   Wt 72.1 kg (159 lb)   SpO2 100%   BMI 23.47 kg/m²     Physical Exam  The patient's throat is red with small blister-like lesions.  Lungs were auscultated.    Vital Signs  Temperature was 101.1 yesterday.    Physical Exam  Constitutional:       General: He is not in acute distress.     Appearance: Normal appearance. He is well-developed and well-groomed. He is not ill-appearing, toxic-appearing or diaphoretic.   HENT:      Head: Normocephalic.      Nose: Nose normal. No congestion or rhinorrhea.      Mouth/Throat:      Mouth: Mucous membranes are moist.      Pharynx: Oropharynx is clear. No oropharyngeal exudate or posterior oropharyngeal erythema.   Eyes:      " General: Lids are normal.         Right eye: No discharge.         Left eye: No discharge.      Extraocular Movements: Extraocular movements intact.      Pupils: Pupils are equal, round, and reactive to light.   Neck:      Vascular: No carotid bruit.   Cardiovascular:      Rate and Rhythm: Normal rate and regular rhythm.      Pulses: Normal pulses.      Heart sounds: Normal heart sounds. No murmur heard.     No friction rub. No gallop.   Pulmonary:      Effort: Pulmonary effort is normal. No respiratory distress.      Breath sounds: Normal breath sounds. No stridor. No wheezing, rhonchi or rales.   Chest:      Chest wall: No tenderness.   Abdominal:      General: Bowel sounds are normal. There is no distension.      Palpations: Abdomen is soft. There is no mass.      Tenderness: There is no abdominal tenderness. There is no right CVA tenderness, left CVA tenderness, guarding or rebound.      Hernia: No hernia is present.   Musculoskeletal:         General: No swelling or tenderness. Normal range of motion.      Cervical back: Normal range of motion and neck supple. No rigidity or tenderness.      Right lower leg: No edema.      Left lower leg: No edema.   Lymphadenopathy:      Cervical: No cervical adenopathy.   Skin:     General: Skin is warm.      Capillary Refill: Capillary refill takes less than 2 seconds.      Coloration: Skin is not jaundiced.      Findings: No bruising, erythema or rash.   Neurological:      General: No focal deficit present.      Mental Status: He is alert and oriented to person, place, and time.      Motor: Motor function is intact. No weakness.      Coordination: Coordination is intact.      Gait: Gait is intact. Gait normal.   Psychiatric:         Attention and Perception: Attention normal.         Mood and Affect: Mood normal.         Speech: Speech normal.         Behavior: Behavior normal.         Cognition and Memory: Cognition normal.         Judgment: Judgment normal.        Result  Review :          Results  Laboratory Studies  COVID-19 and influenza tests were negative.              Assessment and Plan    Diagnoses and all orders for this visit:    1. Fever, unspecified fever cause (Primary)  -     POCT SARS-CoV-2 Antigen VITO + Flu  -     Mononucleosis Screen; Future  -     CBC Auto Differential; Future  -     Comprehensive Metabolic Panel; Future  -     POCT rapid strep A      Assessment & Plan  1. Fatigue.  The etiology of the fatigue could potentially be attributed to a respiratory virus. A Monospot test will be conducted to rule out mononucleosis. Additionally, a strep swab will be performed for further evaluation.    Patient's Body mass index is 23.47 kg/m². indicating that he is within normal range (BMI 18.5-24.9). No BMI management plan needed..    Follow Up   Return labs today.  Patient was given instructions and counseling regarding his condition or for health maintenance advice. Please see specific information pulled into the AVS if appropriate.     This document has been electronically signed by LYNNE Tam  March 31, 2025 15:18 EDT     Patient or patient representative verbalized consent for the use of Ambient Listening during the visit with  LYNNE Tam for chart documentation. 3/31/2025  15:19 EDT

## 2025-04-01 ENCOUNTER — HOSPITAL ENCOUNTER (OUTPATIENT)
Dept: GENERAL RADIOLOGY | Facility: HOSPITAL | Age: 30
Discharge: HOME | End: 2025-04-01
Admitting: NURSE PRACTITIONER
Payer: COMMERCIAL

## 2025-04-01 DIAGNOSIS — R50.9 FEVER, UNSPECIFIED FEVER CAUSE: Primary | ICD-10-CM

## 2025-04-01 DIAGNOSIS — R05.1 ACUTE COUGH: ICD-10-CM

## 2025-04-01 DIAGNOSIS — R50.9 FEVER, UNSPECIFIED FEVER CAUSE: ICD-10-CM

## 2025-04-01 LAB
ALBUMIN SERPL-MCNC: 4.7 G/DL (ref 3.5–5.2)
ALBUMIN/GLOB SERPL: 1.6 G/DL
ALP SERPL-CCNC: 114 U/L (ref 39–117)
ALT SERPL W P-5'-P-CCNC: 98 U/L (ref 1–41)
ANION GAP SERPL CALCULATED.3IONS-SCNC: 11.9 MMOL/L (ref 5–15)
AST SERPL-CCNC: 53 U/L (ref 1–40)
BASOPHILS # BLD AUTO: 0.03 10*3/MM3 (ref 0–0.2)
BASOPHILS NFR BLD AUTO: 0.5 % (ref 0–1.5)
BILIRUB SERPL-MCNC: 0.6 MG/DL (ref 0–1.2)
BUN SERPL-MCNC: 11 MG/DL (ref 6–20)
BUN/CREAT SERPL: 9.3 (ref 7–25)
CALCIUM SPEC-SCNC: 9.7 MG/DL (ref 8.6–10.5)
CHLORIDE SERPL-SCNC: 103 MMOL/L (ref 98–107)
CO2 SERPL-SCNC: 26.1 MMOL/L (ref 22–29)
CREAT SERPL-MCNC: 1.18 MG/DL (ref 0.76–1.27)
DEPRECATED RDW RBC AUTO: 37.2 FL (ref 37–54)
EGFRCR SERPLBLD CKD-EPI 2021: 86.2 ML/MIN/1.73
EOSINOPHIL # BLD AUTO: 0.01 10*3/MM3 (ref 0–0.4)
EOSINOPHIL NFR BLD AUTO: 0.2 % (ref 0.3–6.2)
ERYTHROCYTE [DISTWIDTH] IN BLOOD BY AUTOMATED COUNT: 12.2 % (ref 12.3–15.4)
GLOBULIN UR ELPH-MCNC: 3 GM/DL
GLUCOSE SERPL-MCNC: 82 MG/DL (ref 65–99)
HCT VFR BLD AUTO: 45.2 % (ref 37.5–51)
HGB BLD-MCNC: 15.7 G/DL (ref 13–17.7)
IMM GRANULOCYTES # BLD AUTO: 0.01 10*3/MM3 (ref 0–0.05)
IMM GRANULOCYTES NFR BLD AUTO: 0.2 % (ref 0–0.5)
LYMPHOCYTES # BLD AUTO: 2.2 10*3/MM3 (ref 0.7–3.1)
LYMPHOCYTES NFR BLD AUTO: 38.7 % (ref 19.6–45.3)
MCH RBC QN AUTO: 29.5 PG (ref 26.6–33)
MCHC RBC AUTO-ENTMCNC: 34.7 G/DL (ref 31.5–35.7)
MCV RBC AUTO: 85 FL (ref 79–97)
MONOCYTES # BLD AUTO: 0.65 10*3/MM3 (ref 0.1–0.9)
MONOCYTES NFR BLD AUTO: 11.4 % (ref 5–12)
NEUTROPHILS NFR BLD AUTO: 2.79 10*3/MM3 (ref 1.7–7)
NEUTROPHILS NFR BLD AUTO: 49 % (ref 42.7–76)
NRBC BLD AUTO-RTO: 0 /100 WBC (ref 0–0.2)
PLATELET # BLD AUTO: 153 10*3/MM3 (ref 140–450)
PMV BLD AUTO: 10 FL (ref 6–12)
POTASSIUM SERPL-SCNC: 4.7 MMOL/L (ref 3.5–5.2)
PROT SERPL-MCNC: 7.7 G/DL (ref 6–8.5)
RBC # BLD AUTO: 5.32 10*6/MM3 (ref 4.14–5.8)
SODIUM SERPL-SCNC: 141 MMOL/L (ref 136–145)
WBC NRBC COR # BLD AUTO: 5.69 10*3/MM3 (ref 3.4–10.8)

## 2025-04-01 PROCEDURE — 71046 X-RAY EXAM CHEST 2 VIEWS: CPT | Performed by: RADIOLOGY

## 2025-04-01 PROCEDURE — 71046 X-RAY EXAM CHEST 2 VIEWS: CPT

## 2025-04-02 ENCOUNTER — RESULTS FOLLOW-UP (OUTPATIENT)
Dept: FAMILY MEDICINE CLINIC | Facility: CLINIC | Age: 30
End: 2025-04-02
Payer: COMMERCIAL

## 2025-04-11 ENCOUNTER — RESULTS FOLLOW-UP (OUTPATIENT)
Dept: FAMILY MEDICINE CLINIC | Facility: CLINIC | Age: 30
End: 2025-04-11

## 2025-04-11 ENCOUNTER — LAB (OUTPATIENT)
Dept: FAMILY MEDICINE CLINIC | Facility: CLINIC | Age: 30
End: 2025-04-11
Payer: COMMERCIAL

## 2025-04-11 ENCOUNTER — OFFICE VISIT (OUTPATIENT)
Dept: FAMILY MEDICINE CLINIC | Facility: CLINIC | Age: 30
End: 2025-04-11
Payer: COMMERCIAL

## 2025-04-11 VITALS
WEIGHT: 154.6 LBS | DIASTOLIC BLOOD PRESSURE: 70 MMHG | HEART RATE: 69 BPM | BODY MASS INDEX: 22.9 KG/M2 | OXYGEN SATURATION: 99 % | HEIGHT: 69 IN | SYSTOLIC BLOOD PRESSURE: 114 MMHG | TEMPERATURE: 97.3 F

## 2025-04-11 DIAGNOSIS — R74.8 ELEVATED LIVER ENZYMES: ICD-10-CM

## 2025-04-11 DIAGNOSIS — R00.2 PALPITATIONS: ICD-10-CM

## 2025-04-11 DIAGNOSIS — J30.2 SEASONAL ALLERGIES: ICD-10-CM

## 2025-04-11 DIAGNOSIS — R74.8 ELEVATED LIVER ENZYMES: Primary | ICD-10-CM

## 2025-04-11 LAB
ALBUMIN SERPL-MCNC: 4.4 G/DL (ref 3.5–5.2)
ALP SERPL-CCNC: 87 U/L (ref 39–117)
ALT SERPL W P-5'-P-CCNC: 28 U/L (ref 1–41)
AST SERPL-CCNC: 24 U/L (ref 1–40)
BILIRUB CONJ SERPL-MCNC: 0.2 MG/DL (ref 0–0.3)
BILIRUB INDIRECT SERPL-MCNC: 0.3 MG/DL
BILIRUB SERPL-MCNC: 0.5 MG/DL (ref 0–1.2)
PROT SERPL-MCNC: 7.3 G/DL (ref 6–8.5)

## 2025-04-11 PROCEDURE — 36415 COLL VENOUS BLD VENIPUNCTURE: CPT

## 2025-04-11 PROCEDURE — 99214 OFFICE O/P EST MOD 30 MIN: CPT | Performed by: NURSE PRACTITIONER

## 2025-04-11 PROCEDURE — 80076 HEPATIC FUNCTION PANEL: CPT | Performed by: NURSE PRACTITIONER

## 2025-04-11 NOTE — PROGRESS NOTES
[facility]     Chief Complaint  Abnormal Lab (Follow up for elevated LFT's)    Kevin Lorenzana is a 29 y.o. male who presents today to [facility] for Abnormal Lab (Follow up for elevated LFT's).    HPI:   Abnormal Lab     History of Present Illness  The patient presents for evaluation of elevated hepatic enzymes.    He denies any history of alcohol consumption or hepatic pathology, including hepatic steatosis. His dietary habits are suboptimal, with a substantial portion of his meals being consumed outside the home. He has been self-administering acetaminophen to manage pyrexia with his previous sickness, but stopped that after being told his enzymes were elevated. He denies any exposure to hepatitis viruses and reports no abdominal tenderness.    The patient has a history of palpitations, which have resolved spontaneously without any specific medical intervention. He has not undergone any cardiac ablation procedures.    He reports an overall improvement in his condition but continues to experience some nasal congestion. He is not currently taking any antihistamines but may resume Claritin-D with the change of seasons.    SOCIAL HISTORY  He does not drink alcohol. He works at [employer].    MEDICATIONS  Current: Tylenol, Claritin-D    Previous History:   Past Medical History:   Diagnosis Date    Heart murmur       History reviewed. No pertinent surgical history.   Social History     Socioeconomic History    Marital status: Single   Tobacco Use    Smoking status: Never    Smokeless tobacco: Never   Vaping Use    Vaping status: Never Used   Substance and Sexual Activity    Alcohol use: Not Currently    Drug use: Never    Sexual activity: Defer      Health Maintenance Due   Topic Date Due    HEPATITIS C SCREENING  Never done    ANNUAL PHYSICAL  Never done    COVID-19 Vaccine (1 - 2024-25 season) Never done        Current Medications:  Current Outpatient  "Medications   Medication Sig Dispense Refill    rizatriptan (Maxalt) 10 MG tablet Take 1 tablet by mouth once at onset of headache, may repeat at 2 hour intervals; do not exceed 30 mg in 24 hours. 10 tablet 0    cyclobenzaprine (FLEXERIL) 5 MG tablet TAKE 1 TABLET BY MOUTH AT BEDTIME. (Patient not taking: Reported on 4/11/2025) 30 tablet 0    meclizine (ANTIVERT) 12.5 MG tablet Take 1 tablet by mouth 3 (Three) Times a Day As Needed for Dizziness. (Patient not taking: Reported on 4/11/2025)      ubrogepant (ubrogepant) 50 MG tablet Take 1 tablet by mouth as needed for migraine may repeat dose once after 2 hours if needed (Patient not taking: Reported on 4/11/2025) 16 tablet 0     No current facility-administered medications for this visit.       Allergies:   No Known Allergies    Vitals:   /70 (BP Location: Right arm, Patient Position: Sitting)   Pulse 69   Temp 97.3 °F (36.3 °C) (Temporal)   Ht 175.3 cm (69.02\")   Wt 70.1 kg (154 lb 9.6 oz)   SpO2 99%   BMI 22.82 kg/m²   Estimated body mass index is 22.82 kg/m² as calculated from the following:    Height as of this encounter: 175.3 cm (69.02\").    Weight as of this encounter: 70.1 kg (154 lb 9.6 oz).          Physical Exam:   Physical Exam  Vitals reviewed.   Constitutional:       Appearance: Normal appearance.   HENT:      Head: Normocephalic.      Right Ear: Tympanic membrane and ear canal normal.      Left Ear: Tympanic membrane and ear canal normal.      Nose: Nose normal.      Mouth/Throat:      Mouth: Mucous membranes are moist.      Pharynx: Oropharynx is clear.   Eyes:      Extraocular Movements: Extraocular movements intact.      Conjunctiva/sclera: Conjunctivae normal.   Cardiovascular:      Rate and Rhythm: Normal rate.      Heart sounds: Normal heart sounds.   Pulmonary:      Effort: Pulmonary effort is normal.      Breath sounds: Normal breath sounds.   Abdominal:      General: Bowel sounds are normal. There is no distension.      " Palpations: Abdomen is soft. There is no mass.      Tenderness: There is no abdominal tenderness. There is no guarding or rebound.   Skin:     General: Skin is warm and dry.   Neurological:      Mental Status: He is alert. Mental status is at baseline.      Comments: Normal gait    Psychiatric:         Mood and Affect: Mood normal.        Physical Exam  Ears were examined. Oral exam was performed.  Lungs were auscultated.  Abdomen was palpated.       Lab Results:   Lab on 03/31/2025   Component Date Value Ref Range Status    Monospot 03/31/2025 Negative  Negative Final    WBC 03/31/2025 5.69  3.40 - 10.80 10*3/mm3 Final    RBC 03/31/2025 5.32  4.14 - 5.80 10*6/mm3 Final    Hemoglobin 03/31/2025 15.7  13.0 - 17.7 g/dL Final    Hematocrit 03/31/2025 45.2  37.5 - 51.0 % Final    MCV 03/31/2025 85.0  79.0 - 97.0 fL Final    MCH 03/31/2025 29.5  26.6 - 33.0 pg Final    MCHC 03/31/2025 34.7  31.5 - 35.7 g/dL Final    RDW 03/31/2025 12.2 (L)  12.3 - 15.4 % Final    RDW-SD 03/31/2025 37.2  37.0 - 54.0 fl Final    MPV 03/31/2025 10.0  6.0 - 12.0 fL Final    Platelets 03/31/2025 153  140 - 450 10*3/mm3 Final    Neutrophil % 03/31/2025 49.0  42.7 - 76.0 % Final    Lymphocyte % 03/31/2025 38.7  19.6 - 45.3 % Final    Monocyte % 03/31/2025 11.4  5.0 - 12.0 % Final    Eosinophil % 03/31/2025 0.2 (L)  0.3 - 6.2 % Final    Basophil % 03/31/2025 0.5  0.0 - 1.5 % Final    Immature Grans % 03/31/2025 0.2  0.0 - 0.5 % Final    Neutrophils, Absolute 03/31/2025 2.79  1.70 - 7.00 10*3/mm3 Final    Lymphocytes, Absolute 03/31/2025 2.20  0.70 - 3.10 10*3/mm3 Final    Monocytes, Absolute 03/31/2025 0.65  0.10 - 0.90 10*3/mm3 Final    Eosinophils, Absolute 03/31/2025 0.01  0.00 - 0.40 10*3/mm3 Final    Basophils, Absolute 03/31/2025 0.03  0.00 - 0.20 10*3/mm3 Final    Immature Grans, Absolute 03/31/2025 0.01  0.00 - 0.05 10*3/mm3 Final    nRBC 03/31/2025 0.0  0.0 - 0.2 /100 WBC Final    Glucose 03/31/2025 82  65 - 99 mg/dL Final    BUN  03/31/2025 11  6 - 20 mg/dL Final    Creatinine 03/31/2025 1.18  0.76 - 1.27 mg/dL Final    Sodium 03/31/2025 141  136 - 145 mmol/L Final    Potassium 03/31/2025 4.7  3.5 - 5.2 mmol/L Final    Chloride 03/31/2025 103  98 - 107 mmol/L Final    CO2 03/31/2025 26.1  22.0 - 29.0 mmol/L Final    Calcium 03/31/2025 9.7  8.6 - 10.5 mg/dL Final    Total Protein 03/31/2025 7.7  6.0 - 8.5 g/dL Final    Albumin 03/31/2025 4.7  3.5 - 5.2 g/dL Final    ALT (SGPT) 03/31/2025 98 (H)  1 - 41 U/L Final    AST (SGOT) 03/31/2025 53 (H)  1 - 40 U/L Final    Alkaline Phosphatase 03/31/2025 114  39 - 117 U/L Final    Total Bilirubin 03/31/2025 0.6  0.0 - 1.2 mg/dL Final    Globulin 03/31/2025 3.0  gm/dL Final    A/G Ratio 03/31/2025 1.6  g/dL Final    BUN/Creatinine Ratio 03/31/2025 9.3  7.0 - 25.0 Final    Anion Gap 03/31/2025 11.9  5.0 - 15.0 mmol/L Final    eGFR 03/31/2025 86.2  >60.0 mL/min/1.73 Final   Office Visit on 03/31/2025   Component Date Value Ref Range Status    SARS Antigen 03/31/2025 Not Detected  Not Detected, Presumptive Negative Final    Influenza A Antigen VITO 03/31/2025 Not Detected  Not Detected Final    Influenza B Antigen VITO 03/31/2025 Not Detected  Not Detected Final    Internal Control 03/31/2025 Passed  Passed Final    Lot Number 03/31/2025 [id number]   Final    Expiration Date 03/31/2025 2,[id number]   Final    Rapid Strep A Screen 03/31/2025 Negative  Negative, VALID, INVALID, Not Performed Final    Internal Control 03/31/2025 Passed  Passed Final    Lot Number 03/31/2025 [id number]   Final    Expiration Date 03/31/2025 12,[id number]   Final     Results  Laboratory Studies  Liver enzymes are slightly elevated. Mono test was negative.    Results review: During today's encounter, all relevant clinical data has been reviewed.      Assessment and Plan  Diagnoses and all orders for this visit:    1. Elevated liver enzymes (Primary)  -     Hepatic Function Panel; Future    2. Palpitations    3. Seasonal  allergies      Assessment & Plan  Elevated liver enzymes.  His liver enzymes are slightly elevated,  Previous liver function tests were normal. A re-evaluation of his liver function will be conducted today. The results will be communicated by Monday or Tuesday. If the results are abnormal, further investigation will be initiated to determine the underlying cause.    Palpitations.  He reports no recent episodes of palpitations. Previous tests for palpitations were normal, and no medications were required.    Seasonal allergies.  He experiences seasonal allergies and takes Claritin-D as needed. He may start taking it again when the seasons change, but is doing ok for now.    Follow-up  The patient will follow up as needed.    BMI is within normal parameters. No other follow-up for BMI required.       New Medications:   No orders of the defined types were placed in this encounter.      Discontinued Medications:   There are no discontinued medications.           Visit Diagnoses:    ICD-10-CM ICD-9-CM   1. Elevated liver enzymes  R74.8 790.5   2. Palpitations  R00.2 785.1   3. Seasonal allergies  J30.2 477.9            Follow Up:   Return if symptoms worsen or fail to improve.    Patient was given instructions and counseling regarding his condition or for health maintenance advice. Please see specific information pulled into the AVS if appropriate.     Patient or patient representative verbalized consent for the use of Ambient Listening during the visit with  LYNNE Barreto for chart documentation. 4/11/2025  09:28 EDT      This document has been electronically signed by LYNNE Barreto   April 11, 2025 09:28 EDT    Dictated Utilizing Dragon Dictation: Part of this note may be an electronic transcription/translation of spoken language to printed text using the Dragon Dictation System.

## 2025-07-11 ENCOUNTER — OFFICE VISIT (OUTPATIENT)
Dept: FAMILY MEDICINE CLINIC | Facility: CLINIC | Age: 30
End: 2025-07-11
Payer: COMMERCIAL

## 2025-07-11 VITALS
BODY MASS INDEX: 23.49 KG/M2 | HEART RATE: 88 BPM | WEIGHT: 158.6 LBS | TEMPERATURE: 99.8 F | OXYGEN SATURATION: 96 % | HEIGHT: 69 IN | SYSTOLIC BLOOD PRESSURE: 130 MMHG | DIASTOLIC BLOOD PRESSURE: 82 MMHG

## 2025-07-11 DIAGNOSIS — J06.9 VIRAL URI WITH COUGH: ICD-10-CM

## 2025-07-11 DIAGNOSIS — K21.9 GASTROESOPHAGEAL REFLUX DISEASE, UNSPECIFIED WHETHER ESOPHAGITIS PRESENT: ICD-10-CM

## 2025-07-11 DIAGNOSIS — R05.9 COUGH, UNSPECIFIED TYPE: Primary | ICD-10-CM

## 2025-07-11 LAB
EXPIRATION DATE: NORMAL
FLUAV AG UPPER RESP QL IA.RAPID: NOT DETECTED
FLUBV AG UPPER RESP QL IA.RAPID: NOT DETECTED
INTERNAL CONTROL: NORMAL
Lab: NORMAL
SARS-COV-2 AG UPPER RESP QL IA.RAPID: NOT DETECTED

## 2025-07-11 PROCEDURE — 87428 SARSCOV & INF VIR A&B AG IA: CPT | Performed by: FAMILY MEDICINE

## 2025-07-11 PROCEDURE — 99214 OFFICE O/P EST MOD 30 MIN: CPT | Performed by: FAMILY MEDICINE

## 2025-07-11 RX ORDER — PANTOPRAZOLE SODIUM 40 MG/1
40 TABLET, DELAYED RELEASE ORAL DAILY
Qty: 30 TABLET | Refills: 0 | Status: SHIPPED | OUTPATIENT
Start: 2025-07-11

## 2025-07-11 NOTE — PROGRESS NOTES
Subjective   Kevin Lorenzana is a 29 y.o. male.   Pt presents today with CC of Cough, Nasal Congestion, Headache, and Heartburn      History of Present Illness   History of Present Illness  Patient is a 29-year-old male who complains of 2 days of cough, congestion, and malaise.  He has been exposed to viral illnesses and would like to be tested for COVID and flu.  #2 he suffers from acid reflux with epigastric abdominal discomfort.  It has been off again, on again for the past couple of years.  But it has remained persistent.  When he lays down at night he reports that he sometimes has sour taste in the back of his throat when he wakes up.  He has taken omeprazole 20 mg daily as needed.  He takes that here and there, but not consistently.  He states that it seems to help a little.  Cough  Associated symptoms: headaches    Associated symptoms: no chest pain, no chills, no fever, no sore throat, no weight loss and no wheezing    Headache    Associated symptoms: cough and headaches      Associated symptoms: no abdominal pain, no chest pain, no congestion, no diarrhea, no dizziness, no dysuria, no fever, no sore throat and no wheezing    Heartburn  He complains of coughing. He reports no abdominal pain, no chest pain, no sore throat or no wheezing. Pertinent negatives include no weight loss.        The following portions of the patient's history were reviewed and updated as appropriate: allergies, current medications, past family history, past medical history, past social history, past surgical history, and problem list.    Review of Systems   Constitutional:  Negative for chills, fever and unexpected weight loss.   HENT:  Negative for congestion and sore throat.    Eyes:  Negative for blurred vision and visual disturbance.   Respiratory:  Positive for cough. Negative for wheezing.    Cardiovascular:  Negative for chest pain and palpitations.   Gastrointestinal:  Positive for GERD. Negative for abdominal pain and  diarrhea.   Endocrine: Negative for cold intolerance and heat intolerance.   Genitourinary:  Negative for dysuria.   Musculoskeletal:  Negative for arthralgias and neck stiffness.   Neurological:  Negative for dizziness, seizures and syncope.   Psychiatric/Behavioral:  Negative for self-injury, suicidal ideas and depressed mood.      Vitals:    07/11/25 1248   BP: 130/82   Pulse: 88   Temp: 99.8 °F (37.7 °C)   SpO2: 96%        Objective   Physical Exam  Vitals and nursing note reviewed.   Constitutional:       Appearance: He is well-developed.   HENT:      Head: Normocephalic and atraumatic.      Right Ear: External ear normal.      Left Ear: External ear normal.      Nose: Nose normal.   Eyes:      Conjunctiva/sclera: Conjunctivae normal.      Pupils: Pupils are equal, round, and reactive to light.   Cardiovascular:      Rate and Rhythm: Normal rate and regular rhythm.      Heart sounds: Normal heart sounds.   Pulmonary:      Effort: Pulmonary effort is normal.      Breath sounds: Normal breath sounds.   Abdominal:      General: Bowel sounds are normal.      Palpations: Abdomen is soft.   Musculoskeletal:      Cervical back: Normal range of motion and neck supple.   Skin:     General: Skin is warm and dry.   Neurological:      Mental Status: He is alert and oriented to person, place, and time.   Psychiatric:         Behavior: Behavior normal.         Assessment & Plan   Diagnoses and all orders for this visit:    1. Cough, unspecified type (Primary)  -     POCT SARS-CoV-2 Antigen VITO + Flu  Negative for COVID and flu.  He has an unspecified viral illness.  He does not need a work excuse.  I recommend that he avoid public for the next few days, particularly while he is still symptomatic.  2. Viral URI with cough    3. Gastroesophageal reflux disease, unspecified whether esophagitis present    -     pantoprazole (Protonix) 40 MG EC tablet; Take 1 tablet by mouth Daily.  Dispense: 30 tablet; Refill: 0    Exam was  normal.  He has been taking Prilosec on again, off again here and there recently at 20 mg OTC dose.  I recommend taking Protonix 40 mg daily for the next few weeks and follow-up with me.  If you are still having problems, further evaluation is needed.  He was previously tolerating omeprazole well, he denies any other symptoms.                BMI is within normal parameters. No other follow-up for BMI required.          This document has been electronically signed by Michael Brown DO  July 11, 2025 17:08 EDT    Dictated Utilizing Dragon Dictation: Part of this note may be an electronic transcription/translation of spoken language to printed text using the Dragon Dictation System.